# Patient Record
Sex: MALE | Race: BLACK OR AFRICAN AMERICAN | NOT HISPANIC OR LATINO | Employment: FULL TIME | URBAN - METROPOLITAN AREA
[De-identification: names, ages, dates, MRNs, and addresses within clinical notes are randomized per-mention and may not be internally consistent; named-entity substitution may affect disease eponyms.]

---

## 2018-04-29 ENCOUNTER — HOSPITAL ENCOUNTER (EMERGENCY)
Facility: HOSPITAL | Age: 33
Discharge: HOME | End: 2018-04-29
Attending: EMERGENCY MEDICINE

## 2018-04-29 VITALS
SYSTOLIC BLOOD PRESSURE: 170 MMHG | WEIGHT: 181 LBS | BODY MASS INDEX: 29.09 KG/M2 | RESPIRATION RATE: 16 BRPM | HEIGHT: 66 IN | HEART RATE: 74 BPM | DIASTOLIC BLOOD PRESSURE: 94 MMHG | OXYGEN SATURATION: 98 % | TEMPERATURE: 99 F

## 2018-04-29 DIAGNOSIS — S50.312A ABRASION OF LEFT ELBOW, INITIAL ENCOUNTER: ICD-10-CM

## 2018-04-29 DIAGNOSIS — M25.562 KNEE PAIN, LEFT: ICD-10-CM

## 2018-04-29 DIAGNOSIS — S80.02XA CONTUSION OF LEFT KNEE, INITIAL ENCOUNTER: Primary | ICD-10-CM

## 2018-04-29 DIAGNOSIS — S80.212A ABRASION OF LEFT KNEE, INITIAL ENCOUNTER: ICD-10-CM

## 2018-04-29 PROCEDURE — 99283 EMERGENCY DEPT VISIT LOW MDM: CPT

## 2018-04-29 PROCEDURE — 25000003 PHARM REV CODE 250: Performed by: PHYSICIAN ASSISTANT

## 2018-04-29 RX ORDER — MUPIROCIN 20 MG/G
OINTMENT TOPICAL 3 TIMES DAILY
Qty: 30 G | Refills: 0 | Status: SHIPPED | OUTPATIENT
Start: 2018-04-29

## 2018-04-29 RX ADMIN — BACITRACIN ZINC, NEOMYCIN SULFATE, POLYMYXIN B SULFATE 1 EACH: 3.5; 5000; 4 OINTMENT TOPICAL at 02:04
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
## 2018-04-29 NOTE — ED NOTES
Ace wrap applied over telfa dressing with triple antibiotic ointment.  Patient shown how to adjust ace wrap for comfort.
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
## 2018-12-26 ENCOUNTER — HOSPITAL ENCOUNTER (EMERGENCY)
Facility: HOSPITAL | Age: 33
Discharge: HOME | End: 2018-12-26
Attending: EMERGENCY MEDICINE

## 2018-12-26 VITALS
HEART RATE: 65 BPM | WEIGHT: 195 LBS | HEIGHT: 66 IN | DIASTOLIC BLOOD PRESSURE: 104 MMHG | BODY MASS INDEX: 31.34 KG/M2 | OXYGEN SATURATION: 98 % | SYSTOLIC BLOOD PRESSURE: 188 MMHG | TEMPERATURE: 100 F | RESPIRATION RATE: 18 BRPM

## 2018-12-26 DIAGNOSIS — M25.559 HIP PAIN: Primary | ICD-10-CM

## 2018-12-26 PROCEDURE — 99284 EMERGENCY DEPT VISIT MOD MDM: CPT | Mod: 25

## 2018-12-26 PROCEDURE — 63600175 PHARM REV CODE 636 W HCPCS: Performed by: EMERGENCY MEDICINE

## 2018-12-26 PROCEDURE — 96372 THER/PROPH/DIAG INJ SC/IM: CPT

## 2018-12-26 RX ORDER — ORPHENADRINE CITRATE 100 MG/1
100 TABLET, EXTENDED RELEASE ORAL 2 TIMES DAILY PRN
Qty: 20 TABLET | Refills: 0 | Status: SHIPPED | OUTPATIENT
Start: 2018-12-26 | End: 2018-12-31

## 2018-12-26 RX ORDER — IBUPROFEN 800 MG/1
800 TABLET ORAL EVERY 6 HOURS PRN
Qty: 20 TABLET | Refills: 0 | Status: SHIPPED | OUTPATIENT
Start: 2018-12-26 | End: 2024-03-04 | Stop reason: ALTCHOICE

## 2018-12-26 RX ORDER — DEXAMETHASONE SODIUM PHOSPHATE 4 MG/ML
8 INJECTION, SOLUTION INTRA-ARTICULAR; INTRALESIONAL; INTRAMUSCULAR; INTRAVENOUS; SOFT TISSUE
Status: COMPLETED | OUTPATIENT
Start: 2018-12-26 | End: 2018-12-26

## 2018-12-26 RX ADMIN — DEXAMETHASONE SODIUM PHOSPHATE 8 MG: 4 INJECTION, SOLUTION INTRAMUSCULAR; INTRAVENOUS at 11:12
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
## 2024-03-04 ENCOUNTER — HOSPITAL ENCOUNTER (EMERGENCY)
Facility: HOSPITAL | Age: 39
Discharge: HOME | End: 2024-03-04
Attending: EMERGENCY MEDICINE

## 2024-03-04 VITALS
TEMPERATURE: 98 F | DIASTOLIC BLOOD PRESSURE: 109 MMHG | WEIGHT: 205 LBS | BODY MASS INDEX: 32.18 KG/M2 | OXYGEN SATURATION: 99 % | SYSTOLIC BLOOD PRESSURE: 179 MMHG | HEIGHT: 67 IN | HEART RATE: 72 BPM | RESPIRATION RATE: 18 BRPM

## 2024-03-04 DIAGNOSIS — S92.902A CLOSED FRACTURE OF LEFT FOOT, INITIAL ENCOUNTER: ICD-10-CM

## 2024-03-04 DIAGNOSIS — M25.579 ANKLE PAIN: ICD-10-CM

## 2024-03-04 DIAGNOSIS — S93.492A SPRAIN OF ANTERIOR TALOFIBULAR LIGAMENT OF LEFT ANKLE, INITIAL ENCOUNTER: Primary | ICD-10-CM

## 2024-03-04 PROCEDURE — 25000003 PHARM REV CODE 250

## 2024-03-04 PROCEDURE — 99283 EMERGENCY DEPT VISIT LOW MDM: CPT | Mod: 25

## 2024-03-04 RX ORDER — ACETAMINOPHEN 500 MG
1000 TABLET ORAL
Status: COMPLETED | OUTPATIENT
Start: 2024-03-04 | End: 2024-03-04

## 2024-03-04 RX ORDER — NAPROXEN 500 MG/1
500 TABLET ORAL 2 TIMES DAILY PRN
Qty: 30 TABLET | Refills: 0 | Status: SHIPPED | OUTPATIENT
Start: 2024-03-04

## 2024-03-04 RX ADMIN — ACETAMINOPHEN 1000 MG: 500 TABLET ORAL at 07:03

## 2024-03-04 NOTE — Clinical Note
"Justyn Núñez"Burton was seen and treated in our emergency department on 3/4/2024.  He may return to work on 03/06/2024.       If you have any questions or concerns, please don't hesitate to call.       RN    "

## 2024-03-04 NOTE — Clinical Note
"Justyn Batistavannesa Manrique was seen and treated in our emergency department on 3/4/2024.  He may return to work after being cleared by follow-up physician .       If you have any questions or concerns, please don't hesitate to call.       RN"

## 2024-03-05 NOTE — ED NOTES
Bed: EXAM 21  Expected date:   Expected time:   Means of arrival:   Comments:   

## 2024-03-05 NOTE — DISCHARGE INSTRUCTIONS
Thank you for coming in to see us at [facility]! It was nice to meet you, and I hope you feel better soon. Please feel free to return to the ER at any time should your symptoms get worse, or if you have different emergent concerns.    Our goal in the emergency department is to always give you outstanding care and exceptional service. You may receive a survey by mail or e-mail in the next week regarding your experience in our ED. We would greatly appreciate your completing and returning the survey. Your feedback provides us with a way to recognize our staff who give very good care and it helps us learn how to improve when your experience was below our aspiration of excellence.       Sincerely,    Silvestre Brock MD  Medical Director  Emergency Department  [facility]

## 2024-03-05 NOTE — ED PROVIDER NOTES
Encounter Date: 3/4/2024       History     Chief Complaint   Patient presents with    Ankle Pain     Patient reports injuring his left ankle yesterday while playing sports. He has swelling to his left ankle and unable to put weight on it. Denies other injuries.      HPI  This is a 38 y.o. male who presents to the Emergency Department with left ankle injury yesterday while playing basketball.  States that he twisted it inward.  Difficulty with bearing weight.  Tenderness to the lateral aspect of the ankle, as well as the bottom of the foot the mid aspect when he puts pressure on it from walking.  Denies any other injury.    Review of patient's allergies indicates:  No Known Allergies  No past medical history on file.  No past surgical history on file.  No family history on file.  Social History     Tobacco Use    Smoking status: Every Day     Current packs/day: 0.50     Types: Cigarettes   Substance Use Topics    Alcohol use: Yes     Comment: beer daily     Review of Systems    Physical Exam     Initial Vitals [03/04/24 1910]   BP Pulse Resp Temp SpO2   (!) 198/106 88 18 98.3 °F (36.8 °C) 98 %      MAP       --         Physical Exam    Nursing note and vitals reviewed.  Constitutional: He appears well-developed and well-nourished. He is not diaphoretic. No distress.   HENT:   Head: Normocephalic and atraumatic.   Mouth/Throat: Oropharynx is clear and moist.   Eyes: Conjunctivae are normal.   Cardiovascular:  Normal rate, regular rhythm and intact distal pulses.           Pulmonary/Chest: No respiratory distress.   Musculoskeletal:      Comments: LLE:  Tenderness to the left lateral malleolus and anterior tibiotalar joint.  Also has mild tenderness to the midfoot over the navicular.  Tenderness to the distal left foot     Neurological: He is alert and oriented to person, place, and time.   Skin: Skin is warm and dry. Capillary refill takes less than 2 seconds. No rash noted. No pallor.   Psychiatric: He has a normal  mood and affect.         ED Course   Procedures  Labs Reviewed - No data to display       Imaging Results              X-Ray Ankle Complete Left (Final result)  Result time 03/04/24 19:39:59      Final result by Niko Ware MD (03/04/24 19:39:59)                   Impression:      Lateral malleolar soft tissue swelling with nondisplaced fracture through either the navicular or 1st cuneiform incompletely visualized.    Consider CT of the ankle or foot as clinically warranted.      Electronically signed by: Niko Ware  Date:    03/04/2024  Time:    19:39               Narrative:    EXAMINATION:  XR ANKLE COMPLETE 3 VIEW LEFT    CLINICAL HISTORY:  Pain in unspecified ankle and joints of unspecified foot    TECHNIQUE:  AP, lateral and oblique views of the left ankle were performed.    COMPARISON:  None    FINDINGS:  Degenerative changes are noted in the hindfoot and ankle.  There is a lucency through the 1st cuneiform or the navicular..                                    X-Rays:   Independently Interpreted Readings:   Other Readings:  Left ankle xray:  No acute fracture to the ankle.  Questionable lucency noted in the midfoot, possible nondisplaced navicular fracture.    Medications   acetaminophen tablet 1,000 mg (1,000 mg Oral Given 3/4/24 1938)     Medical Decision Making  This is an emergent evaluation of a 38 y.o.male patient with presentation of left ankle injury yesterday, pain to the left lateral malleolus and midfoot questionable lucency to the midfoot bones on x-ray, evidence ankle fracture.     Initial differentials include but are not limited to:  Left ankle sprain, midfoot fracture, foot sprain, lisfranc injury, less likely ankle fracture    Plan: NWB, walking boot for stability, crutches, f/u wit podiatry, NSAIDs for pain.    Clinical impression and plan including any treatment was discussed with patient.   Pt is to call for follow up with referred physician in 7 days.   Pt is to return to  the ED immediately for any new or worsening symptoms.  They are always welcome to return for any emergent concerns.    The patient had time for ask questions to which they were addressed.   The patient expressed understanding and agrees with my plan.         Amount and/or Complexity of Data Reviewed  Radiology: ordered and independent interpretation performed.    Risk  Prescription drug management.              Attending Attestation:             Attending ED Notes:   HTN noted on today's visit. Pt did not take meds today. Instructed to take meds upon getting home, daily BP checks, med compliance.                             Clinical Impression:  Final diagnoses:  [M25.579] Ankle pain  [S92.902A] Closed fracture of left foot, initial encounter  [S93.492A] Sprain of anterior talofibular ligament of left ankle, initial encounter (Primary)          ED Disposition Condition    Discharge Stable          ED Prescriptions       Medication Sig Dispense Start Date End Date Auth. Provider    naproxen (NAPROSYN) 500 MG tablet Take 1 tablet (500 mg total) by mouth 2 (two) times daily as needed (pain). 30 tablet 3/4/2024 -- Silvestre Brock MD          Follow-up Information       Follow up With Specialties Details Why Contact Info    Say Malcolm, DPM Podiatry, Wound Care Schedule an appointment as soon as possible for a visit   [address]  [address]  [facility] [address]  [phone]               Silvestre Brock MD  03/04/24 3455

## 2024-03-07 ENCOUNTER — OFFICE VISIT (OUTPATIENT)
Dept: PODIATRY | Facility: CLINIC | Age: 39
End: 2024-03-07

## 2024-03-07 ENCOUNTER — HOSPITAL ENCOUNTER (OUTPATIENT)
Dept: RADIOLOGY | Facility: HOSPITAL | Age: 39
Discharge: HOME | End: 2024-03-07
Attending: STUDENT IN AN ORGANIZED HEALTH CARE EDUCATION/TRAINING PROGRAM

## 2024-03-07 VITALS
HEIGHT: 67 IN | SYSTOLIC BLOOD PRESSURE: 152 MMHG | DIASTOLIC BLOOD PRESSURE: 101 MMHG | HEART RATE: 74 BPM | BODY MASS INDEX: 32.11 KG/M2

## 2024-03-07 DIAGNOSIS — S92.902A CLOSED FRACTURE OF LEFT FOOT, INITIAL ENCOUNTER: ICD-10-CM

## 2024-03-07 PROCEDURE — 99999 PR PBB SHADOW E&M-EST. PATIENT-LVL IV: CPT | Mod: PBBFAC,,, | Performed by: STUDENT IN AN ORGANIZED HEALTH CARE EDUCATION/TRAINING PROGRAM

## 2024-03-07 PROCEDURE — 99203 OFFICE O/P NEW LOW 30 MIN: CPT | Mod: S$PBB,,, | Performed by: STUDENT IN AN ORGANIZED HEALTH CARE EDUCATION/TRAINING PROGRAM

## 2024-03-07 PROCEDURE — 73630 X-RAY EXAM OF FOOT: CPT | Mod: 26,LT,, | Performed by: RADIOLOGY

## 2024-03-07 PROCEDURE — 73630 X-RAY EXAM OF FOOT: CPT | Mod: TC,FY,LT

## 2024-03-07 PROCEDURE — 99214 OFFICE O/P EST MOD 30 MIN: CPT | Mod: PBBFAC,25,PN | Performed by: STUDENT IN AN ORGANIZED HEALTH CARE EDUCATION/TRAINING PROGRAM

## 2024-03-07 NOTE — LETTER
March 7, 2024      [place] [facility]  [address]  [address]  [place] [address]  Phone: [phone]  Fax: [phone]       Patient: Justyn Manrique   YOB: 1985  Date of Visit: 03/07/2024    To Whom It May Concern:    Fredis Manrique  was at [facility] on 03/07/2024. The patient may return to work 3/18/24  with restrictions, he will need to be in a boot and on light duty for 6-12 weeks due to foot fracture. He will need to rest and elevate as needed. If you have any questions or concerns, or if I can be of further assistance, please do not hesitate to contact me.    Sincerely,    Tameka Fry DPM

## 2024-03-07 NOTE — PROGRESS NOTES
Subjective:     Patient ID: Justyn Manrique is a 38 y.o. male.    Chief Complaint: Foot Problem (Left ft., fracture, f/u , went to the [facility] on Sunday, happen while he was playing basket ball)    Justyn is a 38 y.o. male who presents to the podiatry clinic  with complaint of  left foot pain. Onset of the symptoms was several days ago. Precipitating event: injured left foot while playing basketball . Current symptoms include:  pain with pressure over navicular and medial cuneiform of left foot . Aggravating factors:  pain with pressure . Symptoms have gradually improved. Patient has had no prior foot problems. Evaluation to date: plain films: abnormal ankle xray shows possible fracture to navicular and medial cuneiform . Treatment to date:  cam boot, which helps . Patients rates pain 2/10 on pain scale.    Review of Systems   Constitutional: Negative for chills, decreased appetite, diaphoresis and fever.   HENT:  Negative for congestion and hearing loss.    Cardiovascular:  Negative for chest pain, claudication, leg swelling and syncope.   Respiratory:  Negative for cough and shortness of breath.    Skin:  Negative for color change, dry skin, flushing, itching, nail changes, poor wound healing and rash.   Musculoskeletal:  Negative for arthritis, back pain, joint pain and joint swelling.   Gastrointestinal:  Negative for nausea and vomiting.   Neurological:  Negative for focal weakness, numbness, paresthesias and weakness.   Psychiatric/Behavioral:  Negative for altered mental status. The patient is not nervous/anxious.         Objective:     Physical Exam  Constitutional:       General: He is not in acute distress.     Appearance: Normal appearance. He is well-developed. He is not diaphoretic.   Cardiovascular:      Comments: Dorsalis pedis and posterior tibial pulses are within normal limits. Skin temperature is within normal limits. Toes are cool to touch and feet are warm proximally. Hair growth is within normal  limits. Skin is normotrophic and without hyperpigmentation. No edema noted. No varicosities or spider veins noted, bilaterally.   Musculoskeletal:         General: Tenderness present.      Comments: Adequate joint range of motion without pain, limitation, nor crepitation to foot and ankle joints. Muscle strength is 5/5 in all groups bilaterally.    Tenderness to navicular and medial cuneiform of left foot, resolving edema noted. No open wounds or signs of infection. Exam limited secondary to pain       Feet:      Right foot:      Skin integrity: Dry skin present. No ulcer, blister, erythema or warmth.      Left foot:      Skin integrity: Dry skin present. No ulcer, blister, erythema or warmth.   Skin:     General: Skin is warm and dry.      Capillary Refill: Capillary refill takes less than 2 seconds.      Comments: Skin is warm and dry, no acute signs of infection noted bilaterally      Toenails are well trimmed and of normal morphology    Otherwise, no open wounds, macerations or hyperkeratotic lesions, bilaterally            Neurological:      Mental Status: He is alert and oriented to person, place, and time.      Sensory: No sensory deficit.      Motor: No abnormal muscle tone.      Comments: Light touch within normal limits.    Psychiatric:         Mood and Affect: Mood normal.         Behavior: Behavior normal.         Thought Content: Thought content normal.       Xray left ankle:     Lateral malleolar soft tissue swelling with nondisplaced fracture through either the navicular or 1st cuneiform incompletely visualized.     Consider CT of the ankle or foot as clinically warranted.       Assessment:      Encounter Diagnosis   Name Primary?    Closed fracture of left foot, initial encounter      Plan:     Justyn was seen today for foot problem.    Diagnoses and all orders for this visit:    Closed fracture of left foot, initial encounter  -     Ambulatory referral/consult to Podiatry  -     X-Ray Foot Complete  Left; Future  -     CT Foot Without Contrast Left; Future  -     Cancel: X-Ray Foot Complete Left; Future  -     Cancel: X-Ray Foot Complete Left; Future  -     X-Ray Foot Complete Left; Future      I counseled the patient on his conditions, their implications and medical management.  Previous ankle xray independently reviewed with patient noting possible navicular/medial cuneiform fracture. Baseline foot xray ordered. CT ordered for further evaluation  RICE, NSAIDs PRN pain  Continue CAM boot, to wear at all times  Return to clinic in 6 weeks, sooner PRN. Will require xray prior to visit.

## 2024-03-13 ENCOUNTER — TELEPHONE (OUTPATIENT)
Dept: PODIATRY | Facility: CLINIC | Age: 39
End: 2024-03-13

## 2024-03-13 ENCOUNTER — PATIENT MESSAGE (OUTPATIENT)
Dept: PODIATRY | Facility: CLINIC | Age: 39
End: 2024-03-13

## 2024-03-13 NOTE — TELEPHONE ENCOUNTER
----- Message from Jason Chowdary sent at 3/13/2024 10:58 AM CDT -----  Pt Requesting Call Back    Who called: staff of [facility]  Who called for pt:  Best call back #: (call) claim # [id number]  Add notes: caller  wants to know if Dr. Fry set for pt to be out of work due to his injuries and was he given a work note for this

## 2024-03-13 NOTE — TELEPHONE ENCOUNTER
Called [employer] back the number they provided, she ask me about paper work they send over here, told her that we received it yesterday, she told me it will take 5 days for them to work on it, ask her why she isn't calling the patient to let him know, that patient isn't with me right now

## 2024-03-13 NOTE — TELEPHONE ENCOUNTER
Pt's [employer] forms were scanned into chart under  and faxed to [phone]. Confirmation was received. Pt notified.

## 2024-03-18 ENCOUNTER — TELEPHONE (OUTPATIENT)
Dept: PODIATRY | Facility: CLINIC | Age: 39
End: 2024-03-18

## 2024-03-18 ENCOUNTER — PATIENT MESSAGE (OUTPATIENT)
Dept: PODIATRY | Facility: CLINIC | Age: 39
End: 2024-03-18

## 2024-03-18 NOTE — TELEPHONE ENCOUNTER
Pt's FMLA forms were scanned into chart under  and faxed to [employer] [phone]. Confirmation was received. Pt notified.

## 2024-03-21 ENCOUNTER — TELEPHONE (OUTPATIENT)
Dept: PODIATRY | Facility: CLINIC | Age: 39
End: 2024-03-21

## 2024-03-21 NOTE — TELEPHONE ENCOUNTER
Contacted pt to inform him he has to turn in RTW form 1 week prior to returning to work. Pt stated he went back to work on light duty on 03/18/2024. I informed pt the forms say 04/18/2024. I am not sure if date on forms is wrong, or patient should not be working yet. Please advise if pt is cleared to work and I can change date on forms and send to patient. Thanks!

## 2024-03-21 NOTE — TELEPHONE ENCOUNTER
Contacted pt and informed him of Dr. Fry's message about him being out until 04/18/2024 and the MRI/CAT SCAN that has not been done. Pt stated his insurance did not cover it, so he did not get it done. Pt stated he was at work but I informed him per Dr. Fry, even if he feels better, he should not be working right now. Pt verbalized understanding and wanted to know if he can be seen before 04/18/2024 to discuss another test that may be covered by his insurance, and to discuss options. Please contact pt to be seen sooner, and discuss other imaging options. I sent him his RTW forms with 04/18/24 as RTW date. I informed pt I would notify Dr. Fry's staff. Message sent.

## 2024-04-16 ENCOUNTER — OFFICE VISIT (OUTPATIENT)
Dept: PODIATRY | Facility: CLINIC | Age: 39
End: 2024-04-16

## 2024-04-16 VITALS
SYSTOLIC BLOOD PRESSURE: 152 MMHG | BODY MASS INDEX: 32.18 KG/M2 | WEIGHT: 205 LBS | HEART RATE: 67 BPM | DIASTOLIC BLOOD PRESSURE: 98 MMHG | HEIGHT: 67 IN

## 2024-04-16 DIAGNOSIS — Z87.81 HEALED FRACTURE: Primary | ICD-10-CM

## 2024-04-16 PROCEDURE — 99214 OFFICE O/P EST MOD 30 MIN: CPT | Mod: S$PBB,,, | Performed by: STUDENT IN AN ORGANIZED HEALTH CARE EDUCATION/TRAINING PROGRAM

## 2024-04-16 PROCEDURE — 99213 OFFICE O/P EST LOW 20 MIN: CPT | Mod: PBBFAC,PN | Performed by: STUDENT IN AN ORGANIZED HEALTH CARE EDUCATION/TRAINING PROGRAM

## 2024-04-16 PROCEDURE — 99999 PR PBB SHADOW E&M-EST. PATIENT-LVL III: CPT | Mod: PBBFAC,,, | Performed by: STUDENT IN AN ORGANIZED HEALTH CARE EDUCATION/TRAINING PROGRAM

## 2024-04-16 RX ORDER — OLMESARTAN MEDOXOMIL AND HYDROCHLOROTHIAZIDE 40/25 40; 25 MG/1; MG/1
1 TABLET ORAL DAILY
COMMUNITY

## 2024-04-16 NOTE — PROGRESS NOTES
Subjective:     Patient ID: Justyn Manrique is a 38 y.o. male.    Chief Complaint: Foot Injury (Left foot injury; fracture)    Justyn is a 38 y.o. male who presents to the podiatry clinic  with complaint of  left foot pain. Onset of the symptoms was several days ago. Precipitating event: injured left foot while playing basketball . Current symptoms include:  pain with pressure over navicular and medial cuneiform of left foot . Aggravating factors:  pain with pressure . Symptoms have gradually improved. Patient has had no prior foot problems. Evaluation to date: plain films: abnormal ankle xray shows possible fracture to navicular and medial cuneiform . Treatment to date:  cam boot, which helps . Patients rates pain 2/10 on pain scale.    4/16/24: Seen today, relates pain has improved. He is not wearing his boot. He is back at work. No further pedal complaints.     Review of Systems   Constitutional: Negative for chills, decreased appetite, diaphoresis and fever.   HENT:  Negative for congestion and hearing loss.    Cardiovascular:  Negative for chest pain, claudication, leg swelling and syncope.   Respiratory:  Negative for cough and shortness of breath.    Skin:  Negative for color change, dry skin, flushing, itching, nail changes, poor wound healing and rash.   Musculoskeletal:  Negative for arthritis, back pain, joint pain and joint swelling.   Gastrointestinal:  Negative for nausea and vomiting.   Neurological:  Negative for focal weakness, numbness, paresthesias and weakness.   Psychiatric/Behavioral:  Negative for altered mental status. The patient is not nervous/anxious.         Objective:     Physical Exam  Constitutional:       General: He is not in acute distress.     Appearance: Normal appearance. He is well-developed. He is not diaphoretic.   Cardiovascular:      Comments: Dorsalis pedis and posterior tibial pulses are within normal limits. Skin temperature is within normal limits. Toes are cool to touch  and feet are warm proximally. Hair growth is within normal limits. Skin is normotrophic and without hyperpigmentation. No edema noted. No varicosities or spider veins noted, bilaterally.   Musculoskeletal:         General: No tenderness.      Comments: Adequate joint range of motion without pain, limitation, nor crepitation to foot and ankle joints. Muscle strength is 5/5 in all groups bilaterally.    Resolved tenderness to navicular and medial cuneiform of left foot, no edema noted. No open wounds or signs of infection.      Feet:      Right foot:      Skin integrity: Dry skin present. No ulcer, blister, erythema or warmth.      Left foot:      Skin integrity: Dry skin present. No ulcer, blister, erythema or warmth.   Skin:     General: Skin is warm and dry.      Capillary Refill: Capillary refill takes less than 2 seconds.      Comments: Skin is warm and dry, no acute signs of infection noted bilaterally      Toenails are well trimmed and of normal morphology    Otherwise, no open wounds, macerations or hyperkeratotic lesions, bilaterally            Neurological:      Mental Status: He is alert and oriented to person, place, and time.      Sensory: No sensory deficit.      Motor: No abnormal muscle tone.      Comments: Light touch within normal limits.    Psychiatric:         Mood and Affect: Mood normal.         Behavior: Behavior normal.         Thought Content: Thought content normal.       Xray left ankle:     Lateral malleolar soft tissue swelling with nondisplaced fracture through either the navicular or 1st cuneiform incompletely visualized.     Consider CT of the ankle or foot as clinically warranted.    Xray left foot: FINDINGS:  Previously noted nondisplaced fracture at the midfoot on comparison ankle radiograph is difficult to definitively correlate on today's exam.  Consider further assessment with CT as warranted.  Lisfranc interval is congruent.  Modest calcaneal spurring incidental os trigonum.             Assessment:      Encounter Diagnosis   Name Primary?    Healed fracture Yes       Plan:     Justyn was seen today for foot injury.    Diagnoses and all orders for this visit:    Healed fracture        I counseled the patient on his conditions, their implications and medical management.  Previous foot xray independently reviewed with patient no fracture noted. Relates he is unable to obtain CT. Denies pain.   Ok to slowly transition to supportive tennis shoes  Declines PT   RICE, NSAIDs PRN pain  RTC PRN

## 2024-10-25 ENCOUNTER — PATIENT MESSAGE (OUTPATIENT)
Dept: RESEARCH | Facility: HOSPITAL | Age: 39
End: 2024-10-25

## 2024-10-30 ENCOUNTER — PATIENT MESSAGE (OUTPATIENT)
Dept: RESEARCH | Facility: HOSPITAL | Age: 39
End: 2024-10-30

## 2025-08-18 ENCOUNTER — HOSPITAL ENCOUNTER (EMERGENCY)
Facility: HOSPITAL | Age: 40
Discharge: HOME | End: 2025-08-18
Attending: EMERGENCY MEDICINE
Payer: COMMERCIAL

## 2025-08-18 VITALS
HEIGHT: 66 IN | OXYGEN SATURATION: 95 % | DIASTOLIC BLOOD PRESSURE: 94 MMHG | BODY MASS INDEX: 32.95 KG/M2 | RESPIRATION RATE: 18 BRPM | TEMPERATURE: 98 F | SYSTOLIC BLOOD PRESSURE: 187 MMHG | HEART RATE: 73 BPM | WEIGHT: 205 LBS

## 2025-08-18 DIAGNOSIS — J40 COMPLICATED BRONCHITIS: Primary | ICD-10-CM

## 2025-08-18 LAB
CTP QC/QA: YES
CTP QC/QA: YES
POC MOLECULAR INFLUENZA A AGN: NEGATIVE
POC MOLECULAR INFLUENZA B AGN: NEGATIVE
SARS-COV-2 RDRP RESP QL NAA+PROBE: NEGATIVE

## 2025-08-18 PROCEDURE — 63600175 PHARM REV CODE 636 W HCPCS: Performed by: EMERGENCY MEDICINE

## 2025-08-18 PROCEDURE — 94760 N-INVAS EAR/PLS OXIMETRY 1: CPT

## 2025-08-18 PROCEDURE — 87502 INFLUENZA DNA AMP PROBE: CPT

## 2025-08-18 PROCEDURE — 87635 SARS-COV-2 COVID-19 AMP PRB: CPT

## 2025-08-18 PROCEDURE — 25000003 PHARM REV CODE 250: Performed by: EMERGENCY MEDICINE

## 2025-08-18 PROCEDURE — 94640 AIRWAY INHALATION TREATMENT: CPT

## 2025-08-18 PROCEDURE — 25000242 PHARM REV CODE 250 ALT 637 W/ HCPCS: Performed by: EMERGENCY MEDICINE

## 2025-08-18 PROCEDURE — 99284 EMERGENCY DEPT VISIT MOD MDM: CPT | Mod: 25

## 2025-08-18 RX ORDER — PREDNISONE 10 MG/1
TABLET ORAL
Qty: 36 TABLET | Refills: 0 | Status: SHIPPED | OUTPATIENT
Start: 2025-08-18

## 2025-08-18 RX ORDER — IPRATROPIUM BROMIDE AND ALBUTEROL SULFATE 2.5; .5 MG/3ML; MG/3ML
3 SOLUTION RESPIRATORY (INHALATION)
Status: COMPLETED | OUTPATIENT
Start: 2025-08-18 | End: 2025-08-18

## 2025-08-18 RX ORDER — PREDNISONE 20 MG/1
60 TABLET ORAL
Status: COMPLETED | OUTPATIENT
Start: 2025-08-18 | End: 2025-08-18

## 2025-08-18 RX ORDER — DOXYCYCLINE HYCLATE 100 MG
100 TABLET ORAL
Status: COMPLETED | OUTPATIENT
Start: 2025-08-18 | End: 2025-08-18

## 2025-08-18 RX ORDER — DOXYCYCLINE 100 MG/1
100 CAPSULE ORAL 2 TIMES DAILY
Qty: 20 CAPSULE | Refills: 0 | Status: SHIPPED | OUTPATIENT
Start: 2025-08-18 | End: 2025-08-28

## 2025-08-18 RX ORDER — ALBUTEROL SULFATE 90 UG/1
1-2 INHALANT RESPIRATORY (INHALATION) EVERY 6 HOURS PRN
Qty: 16 G | Refills: 1 | Status: SHIPPED | OUTPATIENT
Start: 2025-08-18 | End: 2026-08-18

## 2025-08-18 RX ADMIN — DOXYCYCLINE HYCLATE 100 MG: 100 TABLET, COATED ORAL at 11:08

## 2025-08-18 RX ADMIN — PREDNISONE 60 MG: 20 TABLET ORAL at 11:08

## 2025-08-18 RX ADMIN — IPRATROPIUM BROMIDE AND ALBUTEROL SULFATE 3 ML: .5; 3 SOLUTION RESPIRATORY (INHALATION) at 11:08
